# Patient Record
Sex: FEMALE | URBAN - METROPOLITAN AREA
[De-identification: names, ages, dates, MRNs, and addresses within clinical notes are randomized per-mention and may not be internally consistent; named-entity substitution may affect disease eponyms.]

---

## 2022-03-19 PROBLEM — G43.019 INTRACTABLE MIGRAINE WITHOUT AURA AND WITHOUT STATUS MIGRAINOSUS: Status: ACTIVE | Noted: 2021-04-29

## 2023-02-06 ENCOUNTER — TELEPHONE (OUTPATIENT)
Dept: NEUROLOGY | Age: 48
End: 2023-02-06

## 2023-04-03 ENCOUNTER — PATIENT MESSAGE (OUTPATIENT)
Dept: NEUROLOGY | Age: 48
End: 2023-04-03

## 2023-04-05 DIAGNOSIS — G43.019 MIGRAINE WITHOUT AURA, INTRACTABLE, WITHOUT STATUS MIGRAINOSUS: Primary | ICD-10-CM

## 2023-04-06 RX ORDER — ERENUMAB-AOOE 140 MG/ML
140 INJECTION, SOLUTION SUBCUTANEOUS
Qty: 3 ADJUSTABLE DOSE PRE-FILLED PEN SYRINGE | Refills: 4 | Status: SHIPPED | OUTPATIENT
Start: 2023-04-06 | End: 2023-05-04

## 2023-04-07 DIAGNOSIS — G43.019 MIGRAINE WITHOUT AURA, INTRACTABLE, WITHOUT STATUS MIGRAINOSUS: Primary | ICD-10-CM

## 2023-04-07 RX ORDER — SUMATRIPTAN 20 MG/1
SPRAY NASAL
Qty: 4 EACH | Refills: 5 | Status: SHIPPED | OUTPATIENT
Start: 2023-04-07

## 2023-04-07 NOTE — TELEPHONE ENCOUNTER
Requested Prescriptions     Signed Prescriptions Disp Refills    SUMAtriptan (IMITREX) 20 MG/ACT nasal spray 4 each 5     Sig: USE AS DIRECTED ON PACK - USE ONE SPRAY AT ONSET OF MIGRAINE, REPEAT IN 2 HOURS IF NEEDED. MAX OF 2 SPRAYS PER 24 HOURS. DO NOT USE MORE THAN 3 DAYS PER WEEK.      Authorizing Provider: Sharon Quinn

## 2023-04-07 NOTE — TELEPHONE ENCOUNTER
From: Abdulkadir Brochure  To: Dr. Gurpreet Hutson: 4/3/2023 9:01 AM EDT  Subject: Need refill for Aimovig 140mg/ml    Hi Please send a refills for Rx to [facility]. ..they are stating I do not have any refills left  Thank you

## 2024-04-04 ENCOUNTER — TELEPHONE (OUTPATIENT)
Dept: NEUROLOGY | Age: 49
End: 2024-04-04

## 2024-04-04 NOTE — TELEPHONE ENCOUNTER
Patient left a voicemail stating she needs a PA for Aimovig.    Per patients chart last RX would have been 8/2023. Unsure if this medication needs to be pended or if the patient needs to be seen prior to medication refill/PA.    Appointment scheduled with [place] on 4/17/24 @9:30AM.

## 2024-04-05 RX ORDER — ERENUMAB-AOOE 140 MG/ML
INJECTION, SOLUTION SUBCUTANEOUS
Qty: 140 ML | Refills: 0 | OUTPATIENT
Start: 2024-04-05

## 2024-04-08 ENCOUNTER — CLINICAL DOCUMENTATION (OUTPATIENT)
Dept: NEUROLOGY | Age: 49
End: 2024-04-08

## 2024-04-08 NOTE — PROGRESS NOTES
Called patient to advise unsure when the PA and rx will be sent over so I will place a sample in the fridge for her. Patient states she will have her  stop by within a few days to get the sample as she stays in [place]. Patient has been out of Aimovig 140 for 2 weeks. Medication was pended on 4/4/24. Rx has to be send prior to PA process being started.

## 2024-04-15 NOTE — PROGRESS NOTES
[facility] NEUROLOGY  [address], [address]  [place] [address]  Phone: [phone] Fax [phone]  JAISON Dudley        Patient: Maria M Berry  Provider: JAISON Dudley    CC:   Chief Complaint   Patient presents with    Follow-up    Migraine         PCP: Mario Connelly MD    History of Present Illness:     Maria M Berry is a 49 y.o. female who presents for follow-up of migraine. She was previously followed by Dr. JEFFREY.    Today, the patient arrived to the office ambulating independently and is unaccompanied for her visit. Denies any recent falls.    Only history of virtual visits.    Headaches are located occipital (uni or bilateral) and will occasional radiate anteriorly (usually uni, but can be bilateral)  They began around 3 or 4 years old.  The current frequency of headaches: at least 1 x per week.  Duration: 2 hours- 6 hours.  Severity is 4-9. Quality of headaches are described as pressure, dull, throbbing, sharp.  Associated symptoms: nausea, vomiting, photo, phono, osmo.  The headaches are exacerbated by change in barometric pressure, lack of sleep, dehydration, increased stress/anxiety, insomnia, neck pain and stiffness.   Factors that relieve the headaches are medication, dark quite room, cold or warm wash cloth, peppermint oil.  Most bothersome migraine symptoms pain, unable complete daily tasks as usual.     History of head trauma: no    Family history of migraines: yes; mother, two brothers    Last eye exam: August 2022    Regular physical activity: yes, walking 30-40 minutes on most days    Daily hydration: 40-60 oz, minimal caffeine intake    On average, sleeping pattern is interrupted.     Occupation:  requires extended time on electronic devices      Current Medications:  Preventive: Aimovig 140 mg/ml once monthly injectable (last injection was two weeks ago)  Abortive: Imitrex 20 mg/nasal spray  Zanaflex 4 mg - not currently taking regularly; only (rarely) as

## 2024-04-17 ENCOUNTER — OFFICE VISIT (OUTPATIENT)
Dept: NEUROLOGY | Age: 49
End: 2024-04-17
Payer: COMMERCIAL

## 2024-04-17 VITALS
SYSTOLIC BLOOD PRESSURE: 112 MMHG | HEIGHT: 64 IN | WEIGHT: 150 LBS | BODY MASS INDEX: 25.61 KG/M2 | DIASTOLIC BLOOD PRESSURE: 76 MMHG

## 2024-04-17 DIAGNOSIS — R11.2 NAUSEA AND VOMITING, UNSPECIFIED VOMITING TYPE: ICD-10-CM

## 2024-04-17 DIAGNOSIS — G43.019 INTRACTABLE MIGRAINE WITHOUT AURA AND WITHOUT STATUS MIGRAINOSUS: Primary | ICD-10-CM

## 2024-04-17 DIAGNOSIS — M43.6 STIFFNESS OF NECK: ICD-10-CM

## 2024-04-17 DIAGNOSIS — G47.9 SLEEP DISTURBANCE: ICD-10-CM

## 2024-04-17 DIAGNOSIS — G43.819 CERVICOGENIC MIGRAINE WITH INTRACTABLE MIGRAINE AND WITHOUT STATUS MIGRAINOSUS: ICD-10-CM

## 2024-04-17 DIAGNOSIS — F41.8 SITUATIONAL ANXIETY: ICD-10-CM

## 2024-04-17 DIAGNOSIS — F40.240 CLAUSTROPHOBIA: ICD-10-CM

## 2024-04-17 DIAGNOSIS — R53.83 OTHER FATIGUE: ICD-10-CM

## 2024-04-17 PROCEDURE — 99215 OFFICE O/P EST HI 40 MIN: CPT

## 2024-04-17 RX ORDER — NORETHINDRONE ACETATE AND ETHINYL ESTRADIOL 1; 5 MG/1; UG/1
1 TABLET ORAL DAILY
COMMUNITY

## 2024-04-17 RX ORDER — TIZANIDINE 4 MG/1
4 TABLET ORAL EVERY 6 HOURS PRN
COMMUNITY

## 2024-04-17 RX ORDER — RIMEGEPANT SULFATE 75 MG/75MG
75 TABLET, ORALLY DISINTEGRATING ORAL
Qty: 9 TABLET | Refills: 3 | Status: SHIPPED | OUTPATIENT
Start: 2024-04-17 | End: 2024-04-17

## 2024-04-17 RX ORDER — PANTOPRAZOLE SODIUM 40 MG/1
40 TABLET, DELAYED RELEASE ORAL DAILY
COMMUNITY

## 2024-04-17 RX ORDER — TRAMADOL HYDROCHLORIDE 50 MG/1
50 TABLET ORAL EVERY 6 HOURS PRN
COMMUNITY

## 2024-04-17 RX ORDER — HYDROCHLOROTHIAZIDE 25 MG/1
25 TABLET ORAL EVERY MORNING
COMMUNITY
Start: 2024-01-08

## 2024-04-17 RX ORDER — ERENUMAB-AOOE 140 MG/ML
140 INJECTION, SOLUTION SUBCUTANEOUS
COMMUNITY

## 2024-04-17 RX ORDER — LANOLIN ALCOHOL/MO/W.PET/CERES
400 CREAM (GRAM) TOPICAL DAILY
COMMUNITY

## 2024-04-19 RX ORDER — LORAZEPAM 1 MG/1
TABLET ORAL
Qty: 3 TABLET | Refills: 0 | Status: SHIPPED | OUTPATIENT
Start: 2024-04-19 | End: 2024-05-20

## 2024-05-13 ENCOUNTER — HOSPITAL ENCOUNTER (OUTPATIENT)
Dept: MRI IMAGING | Age: 49
Discharge: HOME | End: 2024-05-16
Payer: COMMERCIAL

## 2024-05-13 DIAGNOSIS — G43.019 INTRACTABLE MIGRAINE WITHOUT AURA AND WITHOUT STATUS MIGRAINOSUS: ICD-10-CM

## 2024-05-13 PROCEDURE — A9579 GAD-BASE MR CONTRAST NOS,1ML: HCPCS

## 2024-05-13 PROCEDURE — 6360000004 HC RX CONTRAST MEDICATION

## 2024-05-13 PROCEDURE — 70553 MRI BRAIN STEM W/O & W/DYE: CPT

## 2024-05-13 RX ADMIN — GADOTERIDOL 14 ML: 279.3 INJECTION, SOLUTION INTRAVENOUS at 18:47

## 2024-07-17 ENCOUNTER — OFFICE VISIT (OUTPATIENT)
Dept: NEUROLOGY | Age: 49
End: 2024-07-17
Payer: COMMERCIAL

## 2024-07-17 VITALS
SYSTOLIC BLOOD PRESSURE: 131 MMHG | BODY MASS INDEX: 26.26 KG/M2 | DIASTOLIC BLOOD PRESSURE: 88 MMHG | WEIGHT: 153 LBS | HEART RATE: 78 BPM

## 2024-07-17 DIAGNOSIS — G43.809 CERVICOGENIC MIGRAINE: ICD-10-CM

## 2024-07-17 DIAGNOSIS — G43.019 INTRACTABLE MIGRAINE WITHOUT AURA AND WITHOUT STATUS MIGRAINOSUS: Primary | ICD-10-CM

## 2024-07-17 DIAGNOSIS — R11.2 NAUSEA AND VOMITING, UNSPECIFIED VOMITING TYPE: ICD-10-CM

## 2024-07-17 DIAGNOSIS — M43.6 NECK STIFFNESS: ICD-10-CM

## 2024-07-17 PROBLEM — N95.1 MENOPAUSAL SYMPTOMS: Status: ACTIVE | Noted: 2021-04-13

## 2024-07-17 PROBLEM — R79.89 ELEVATED LFTS: Status: ACTIVE | Noted: 2019-11-11

## 2024-07-17 PROBLEM — K21.9 CHRONIC GERD: Status: ACTIVE | Noted: 2023-06-04

## 2024-07-17 PROBLEM — R87.615 ENCOUNTER FOR REPEAT PAP SMEAR DUE TO PREVIOUS INSUFFICIENT CERVICAL CELLS: Status: ACTIVE | Noted: 2022-05-05

## 2024-07-17 PROBLEM — E55.9 VITAMIN D DEFICIENCY: Status: ACTIVE | Noted: 2017-12-22

## 2024-07-17 PROBLEM — R06.02 SHORTNESS OF BREATH: Status: ACTIVE | Noted: 2017-12-22

## 2024-07-17 PROBLEM — E87.6 HYPOKALEMIA: Status: ACTIVE | Noted: 2019-11-11

## 2024-07-17 PROBLEM — D64.9 MILD CHRONIC ANEMIA: Status: ACTIVE | Noted: 2017-12-22

## 2024-07-17 PROBLEM — E78.5 DYSLIPIDEMIA: Status: ACTIVE | Noted: 2017-12-22

## 2024-07-17 PROBLEM — N20.0 KIDNEY STONES: Status: ACTIVE | Noted: 2017-12-22

## 2024-07-17 PROBLEM — Z87.42 HISTORY OF MENORRHAGIA: Status: ACTIVE | Noted: 2017-12-22

## 2024-07-17 PROBLEM — R04.2 HEMOPTYSIS: Status: ACTIVE | Noted: 2022-10-23

## 2024-07-17 PROBLEM — N95.1 MENOPAUSAL SYNDROME: Status: ACTIVE | Noted: 2020-02-03

## 2024-07-17 PROCEDURE — 99215 OFFICE O/P EST HI 40 MIN: CPT

## 2024-07-17 RX ORDER — UBROGEPANT 100 MG/1
100 TABLET ORAL PRN
Qty: 16 TABLET | Refills: 3 | Status: SHIPPED | OUTPATIENT
Start: 2024-07-17

## 2024-07-17 RX ORDER — ERENUMAB-AOOE 140 MG/ML
140 INJECTION, SOLUTION SUBCUTANEOUS
Qty: 1 ADJUSTABLE DOSE PRE-FILLED PEN SYRINGE | Refills: 11 | Status: SHIPPED | OUTPATIENT
Start: 2024-07-17

## 2024-07-17 RX ORDER — ONDANSETRON 8 MG/1
8 TABLET, ORALLY DISINTEGRATING ORAL EVERY 8 HOURS PRN
Qty: 21 TABLET | Refills: 1 | Status: SHIPPED | OUTPATIENT
Start: 2024-07-17

## 2024-07-17 NOTE — PROGRESS NOTES
cough/audible wheeze    Neurological:  Mental Status: Alert and oriented x 3  Cranial Nerves:   I- deferred  II, III, IV, VI-Pupils equal, round and reactive to light.  Extraocular movements intact.  Visual fields full.   V-Facial sensation intact to light touch V1-V3  VII-Face symmetric  VIII-Hearing intact to finger rub  IX-deferred  X, XII-Speech clear and fluent. Tongue midline  XI-Shoulder shrug strong and symmetric    Sensation: intact to light touch in bilateral upper and lower extremities  Strength: normal tone.  5/5 strength in bilateral deltoids, biceps, triceps, interossei, hip flexors, knee extensors, knee flexors. Without tremor.   Cerebellar function: Finger-to-nose bilateral upper extremities intact.  Negative Romberg  Reflexes: deep tendon reflexes- 2+ bilateral biceps, brachioradialis, triceps, patellar   Gait: casual and tandem steady     Assessment/ Plan:    1. Intractable migraine without aura and without status migrainosus  -     Erenumab-aooe (AIMOVIG) 140 MG/ML SOAJ; Inject 140 mg into the skin every 30 days, Disp-1 Adjustable Dose Pre-filled Pen Syringe, R-11Normal  -     ondansetron (ZOFRAN-ODT) 8 MG TBDP disintegrating tablet; Place 1 tablet under the tongue every 8 hours as needed for Nausea or Vomiting, Disp-21 tablet, R-1Normal  -     Ubrogepant (UBRELVY) 100 MG TABS; Take 100 mg by mouth as needed (migraines) Take 1 tablet by mouth at onset of migraines, may repeat in 2 hours x 1 dose. Max dose of 2 tablets per day., Disp-16 tablet, R-3Normal  -     External Referral to Physical Therapy  2. Neck stiffness  -     External Referral to Physical Therapy  3. Nausea and vomiting, unspecified vomiting type  -     ondansetron (ZOFRAN-ODT) 8 MG TBDP disintegrating tablet; Place 1 tablet under the tongue every 8 hours as needed for Nausea or Vomiting, Disp-21 tablet, R-1Normal  4. Cervicogenic migraine  -     Erenumab-aooe (AIMOVIG) 140 MG/ML SOAJ; Inject 140 mg into the skin every 30 days,

## 2024-08-07 DIAGNOSIS — G43.809 CERVICOGENIC MIGRAINE: ICD-10-CM

## 2024-08-07 DIAGNOSIS — G43.019 INTRACTABLE MIGRAINE WITHOUT AURA AND WITHOUT STATUS MIGRAINOSUS: ICD-10-CM

## 2024-08-07 DIAGNOSIS — R11.2 NAUSEA AND VOMITING, UNSPECIFIED VOMITING TYPE: ICD-10-CM

## 2024-08-07 NOTE — TELEPHONE ENCOUNTER
Patient has sent 2 my chart messages regarding her prescriptions. Prescriptions were sent for Aimovig, Ubrelvy and Zofran for a 30 day supply was sent to [facility]. [facility] will not process the prescriptions since they are for a 30 day supply and not 90. She needs a 90 day supply sent to [facility] or a 30 day to [facility].

## 2024-08-08 RX ORDER — ONDANSETRON 8 MG/1
8 TABLET, ORALLY DISINTEGRATING ORAL EVERY 8 HOURS PRN
Qty: 21 TABLET | Refills: 1 | Status: SHIPPED | OUTPATIENT
Start: 2024-08-08

## 2024-08-08 RX ORDER — UBROGEPANT 100 MG/1
100 TABLET ORAL PRN
Qty: 16 TABLET | Refills: 3 | Status: SHIPPED | OUTPATIENT
Start: 2024-08-08

## 2024-08-08 RX ORDER — ERENUMAB-AOOE 140 MG/ML
140 INJECTION, SOLUTION SUBCUTANEOUS
Qty: 1 ADJUSTABLE DOSE PRE-FILLED PEN SYRINGE | Refills: 11 | Status: SHIPPED | OUTPATIENT
Start: 2024-08-08

## 2024-09-09 DIAGNOSIS — G43.809 CERVICOGENIC MIGRAINE: ICD-10-CM

## 2024-09-09 DIAGNOSIS — G43.019 INTRACTABLE MIGRAINE WITHOUT AURA AND WITHOUT STATUS MIGRAINOSUS: ICD-10-CM

## 2024-09-09 RX ORDER — ERENUMAB-AOOE 140 MG/ML
140 INJECTION, SOLUTION SUBCUTANEOUS
Qty: 3 ADJUSTABLE DOSE PRE-FILLED PEN SYRINGE | Refills: 3 | Status: SHIPPED | OUTPATIENT
Start: 2024-09-09

## 2024-09-18 ENCOUNTER — OFFICE VISIT (OUTPATIENT)
Dept: NEUROLOGY | Age: 49
End: 2024-09-18
Payer: COMMERCIAL

## 2024-09-18 VITALS
DIASTOLIC BLOOD PRESSURE: 91 MMHG | OXYGEN SATURATION: 98 % | HEIGHT: 64 IN | BODY MASS INDEX: 26.22 KG/M2 | HEART RATE: 77 BPM | SYSTOLIC BLOOD PRESSURE: 132 MMHG | WEIGHT: 153.6 LBS

## 2024-09-18 DIAGNOSIS — G43.019 MIGRAINE WITHOUT AURA, INTRACTABLE, WITHOUT STATUS MIGRAINOSUS: Primary | ICD-10-CM

## 2024-09-18 DIAGNOSIS — G43.809 CERVICOGENIC MIGRAINE: ICD-10-CM

## 2024-09-18 PROCEDURE — 99213 OFFICE O/P EST LOW 20 MIN: CPT | Performed by: PHYSICAL THERAPIST

## 2024-09-18 RX ORDER — SUMATRIPTAN 20 MG/1
1 SPRAY NASAL DAILY PRN
Qty: 1 EACH | Refills: 3 | Status: SHIPPED | OUTPATIENT
Start: 2024-09-18 | End: 2025-09-18

## 2024-09-18 ASSESSMENT — ENCOUNTER SYMPTOMS
NAUSEA: 1
VOMITING: 1
PHOTOPHOBIA: 1
EYE PAIN: 0
SORE THROAT: 0
COUGH: 0
ABDOMINAL PAIN: 0
CHEST TIGHTNESS: 0

## 2024-10-04 RX ORDER — ATOGEPANT 60 MG/1
60 TABLET ORAL DAILY
Qty: 30 TABLET | Refills: 11 | Status: SHIPPED | OUTPATIENT
Start: 2024-10-04 | End: 2025-09-29

## 2024-10-08 RX ORDER — ATOGEPANT 60 MG/1
60 TABLET ORAL DAILY
Qty: 30 TABLET | Refills: 11 | Status: SHIPPED | OUTPATIENT
Start: 2024-10-08 | End: 2025-10-03

## 2024-10-08 RX ORDER — SUMATRIPTAN 20 MG/1
1 SPRAY NASAL DAILY PRN
Qty: 30 EACH | Refills: 2 | Status: SHIPPED | OUTPATIENT
Start: 2024-10-08 | End: 2025-01-06

## 2024-11-04 RX ORDER — ATOGEPANT 60 MG/1
60 TABLET ORAL DAILY
Qty: 90 TABLET | Refills: 3 | Status: SHIPPED | OUTPATIENT
Start: 2024-11-04 | End: 2025-10-30

## 2024-11-05 RX ORDER — ATOGEPANT 60 MG/1
60 TABLET ORAL DAILY
Qty: 30 TABLET | Refills: 11 | OUTPATIENT
Start: 2024-11-05 | End: 2025-10-31

## 2025-03-19 ENCOUNTER — OFFICE VISIT (OUTPATIENT)
Dept: NEUROLOGY | Age: 50
End: 2025-03-19
Payer: COMMERCIAL

## 2025-03-19 VITALS
WEIGHT: 147 LBS | SYSTOLIC BLOOD PRESSURE: 136 MMHG | BODY MASS INDEX: 25.23 KG/M2 | HEART RATE: 107 BPM | OXYGEN SATURATION: 98 % | DIASTOLIC BLOOD PRESSURE: 89 MMHG

## 2025-03-19 DIAGNOSIS — M54.2 CHRONIC NECK PAIN: ICD-10-CM

## 2025-03-19 DIAGNOSIS — G44.229 CHRONIC TENSION-TYPE HEADACHE, NOT INTRACTABLE: ICD-10-CM

## 2025-03-19 DIAGNOSIS — G43.019 INTRACTABLE MIGRAINE WITHOUT AURA AND WITHOUT STATUS MIGRAINOSUS: Primary | ICD-10-CM

## 2025-03-19 DIAGNOSIS — G47.9 SLEEP DISTURBANCE: ICD-10-CM

## 2025-03-19 DIAGNOSIS — G89.29 CHRONIC NECK PAIN: ICD-10-CM

## 2025-03-19 DIAGNOSIS — Z79.899 MEDICATION MANAGEMENT: ICD-10-CM

## 2025-03-19 PROCEDURE — 99215 OFFICE O/P EST HI 40 MIN: CPT | Performed by: PSYCHIATRY & NEUROLOGY

## 2025-03-19 RX ORDER — NORTRIPTYLINE HYDROCHLORIDE 10 MG/1
20 CAPSULE ORAL NIGHTLY
Qty: 60 CAPSULE | Refills: 2 | Status: SHIPPED | OUTPATIENT
Start: 2025-03-19 | End: 2025-06-17

## 2025-03-19 NOTE — PROGRESS NOTES
NTP for chronic headaches.  She has a mix of tension type headaches possible cervicogenic headaches and migraines.  Migraine seems to be pretty well-controlled with Imitrex and Aimovig.  Advised her to avoid taking frequent in medications in addition to Imitrex which can lead to rebound headache.  She might benefit from additional medicine to address tension type headaches along with physical therapy.  Given some sleep disturbances I think a small dose tricyclic antidepressant would be a good choice.  Explained to the patient that if she develops any side effects to small dose I would recommend her to reach out so we can switch medicine.  Advised her to let me know where she wants to be referred for physical therapy.      -Continue with Aimovig 140 mg q. monthly  - Sumatriptan 20 mg intranasal spray as needed for severe headaches  -Start nortriptyline 10 mg nightly for 2 weeks then increase to 20 mg  -Patient will inform me where she wants to go for physical therapy since she lives in [place]      Return in about 4 months (around 7/19/2025) for Headache follow up.    Eloisa Wakefield MD   General Neurology   [address], [place] [address]  Phone: [phone]    All medications and relevant precautions were fully reviewed with the patient. More than 50% of this visit was used to evaluate, educate and coordinate care for the patient for the above concerns. Total visit time: 55 minutes. Time includes pre- and post- face-to-face time, including record review of available pertinent images and reports. I have written all aspects of this note, parts of which were produced using speech recognition software, which may contain inadvertent errors in the produced words.

## 2025-07-23 RX ORDER — ERENUMAB-AOOE 140 MG/ML
140 INJECTION, SOLUTION SUBCUTANEOUS
Qty: 3 ADJUSTABLE DOSE PRE-FILLED PEN SYRINGE | Refills: 3 | Status: CANCELLED | OUTPATIENT
Start: 2025-07-23

## 2025-07-24 ENCOUNTER — TELEMEDICINE (OUTPATIENT)
Dept: NEUROLOGY | Age: 50
End: 2025-07-24
Payer: COMMERCIAL

## 2025-07-24 DIAGNOSIS — G43.019 INTRACTABLE MIGRAINE WITHOUT AURA AND WITHOUT STATUS MIGRAINOSUS: Primary | ICD-10-CM

## 2025-07-24 DIAGNOSIS — M54.2 CHRONIC NECK PAIN: ICD-10-CM

## 2025-07-24 DIAGNOSIS — G89.29 CHRONIC NECK PAIN: ICD-10-CM

## 2025-07-24 DIAGNOSIS — G43.809 CERVICOGENIC MIGRAINE: ICD-10-CM

## 2025-07-24 PROCEDURE — 99214 OFFICE O/P EST MOD 30 MIN: CPT | Performed by: PSYCHIATRY & NEUROLOGY

## 2025-07-24 RX ORDER — FREMANEZUMAB-VFRM 225 MG/1.5ML
225 INJECTION SUBCUTANEOUS
Qty: 4.5 ML | Refills: 5 | Status: SHIPPED | OUTPATIENT
Start: 2025-07-24 | End: 2025-07-25

## 2025-07-24 NOTE — PROGRESS NOTES
[facility] NEUROLOGY - VIRTUAL VISIT - ENCOUNTER    Patient: Maria M Berry  Physician: Eloisa Wakefield MD    CC: No chief complaint on file.      PCP: Mario Connelly MD  Referring Provider: No ref. provider found    Virtual Encounter     Maria M Berry is a 50 y.o. female who was seen by synchronous (real-time) audio-video technology using Epic MyChart via link on 7/24/2025.    Consent: She and/or her healthcare decision maker is aware that this patient-initiated Telehealth encounter is a billable service, with coverage as determined by her insurance carrier. She is aware that she may receive a bill and has provided verbal consent to proceed: Yes.  I was in the office while conducting this encounter. Patient Location: Patient Home.     Subjective     Interval History on 7/24/2025:    History of Present Illness  Maria M Berry is a 49 YO female who presents for follow-up of headaches.  She denies significant improvement in her headaches.  She unfortunately was not able to start nortriptyline because she recall that she did try it before and was not effective for her.  She continues to have a mix headaches which makes of migraines and tension type headaches.  She is actually of CGRP inhibitor right now as well but she will be getting Aimovig shortly.  It is pretty expensive for her so if there is anything more affordable she would like to try that.  She previously did try Qulipta as samples and it was very effective however subsequently it was not approved by her insurance  Patient is more often to do physical therapy for her neck at this moment so she requests referral to [facility] in [place].  Sumatriptan continues to work intermittently as abortive agent for her  Previous medication trials for headaches include Topamax, Nurtec, naratriptan, nortriptyline/amitriptyline, verapamil Qulipta--- was effective but was not approved for insurance      Interval History on 3/19/2025:  Maria M Berry is a 50 y.o.

## 2025-07-25 RX ORDER — FREMANEZUMAB-VFRM 225 MG/1.5ML
225 INJECTION SUBCUTANEOUS
Qty: 1.5 ML | Refills: 5 | Status: SHIPPED | OUTPATIENT
Start: 2025-07-25 | End: 2025-08-24

## 2025-07-31 ENCOUNTER — TELEPHONE (OUTPATIENT)
Dept: NEUROLOGY | Age: 50
End: 2025-07-31